# Patient Record
Sex: MALE | Race: BLACK OR AFRICAN AMERICAN | NOT HISPANIC OR LATINO | URBAN - METROPOLITAN AREA
[De-identification: names, ages, dates, MRNs, and addresses within clinical notes are randomized per-mention and may not be internally consistent; named-entity substitution may affect disease eponyms.]

---

## 2023-07-28 ENCOUNTER — HOSPITAL ENCOUNTER (OUTPATIENT)
Dept: DATA CONVERSION | Facility: HOSPITAL | Age: 28
Discharge: HOME | End: 2023-07-28

## 2023-07-28 DIAGNOSIS — M54.2 CERVICALGIA: ICD-10-CM

## 2023-07-28 DIAGNOSIS — S16.1XXA STRAIN OF MUSCLE, FASCIA AND TENDON AT NECK LEVEL, INITIAL ENCOUNTER: ICD-10-CM
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
## 2023-07-28 LAB
BACTERIA SPEC CULT: NORMAL
DEPRECATED S PYO AG THROAT QL EIA: NORMAL
REPORT STATUS -LH SQ DATA CONVERSION: NORMAL
SERVICE CMNT-IMP: NORMAL
SPECIMEN SOURCE: NORMAL

## 2025-06-10 ENCOUNTER — OFFICE VISIT (OUTPATIENT)
Dept: URGENT CARE | Age: 30
End: 2025-06-10
Payer: COMMERCIAL

## 2025-06-10 VITALS
OXYGEN SATURATION: 97 % | RESPIRATION RATE: 17 BRPM | SYSTOLIC BLOOD PRESSURE: 125 MMHG | WEIGHT: 217 LBS | TEMPERATURE: 98.1 F | BODY MASS INDEX: 29.39 KG/M2 | DIASTOLIC BLOOD PRESSURE: 77 MMHG | HEIGHT: 72 IN | HEART RATE: 70 BPM

## 2025-06-10 DIAGNOSIS — R10.9 ABDOMINAL PAIN, UNSPECIFIED ABDOMINAL LOCATION: Primary | ICD-10-CM

## 2025-06-10 ASSESSMENT — ENCOUNTER SYMPTOMS
LOSS OF SENSATION IN FEET: 0
ABDOMINAL PAIN: 1
DEPRESSION: 0
OCCASIONAL FEELINGS OF UNSTEADINESS: 0

## 2025-06-10 ASSESSMENT — PAIN SCALES - GENERAL: PAINLEVEL_OUTOF10: 4

## 2025-06-10 NOTE — PROGRESS NOTES
Subjective   Patient ID: Atif Regan is a 29 y.o. male. They present today with a chief complaint of Abdominal Pain (Stomach pain or food poison 1 day).    History of Present Illness  Pt presents with abd pain. Started around 6 pm last night after eating [employer]. Pain is located in the RLQ/periumbilical region. It is constant, non radiating. Described as aching. Rated 7-8/10. No hx of similar. Has not tried anything for sx at home. Denies aggravating and alleviating factors. Denies fever chills back pain urinary sx nvd constipation. Last BM this morning normal. No hematochezia, melena. Pt states he was at [facility] and left due to wait time.       History provided by:  Patient  Abdominal Pain        Past Medical History  Allergies as of 06/10/2025    (No Known Allergies)       Prescriptions Prior to Admission[1]     Medical History[2]    Surgical History[3]     reports that he has never smoked. He has never used smokeless tobacco.    Review of Systems  Review of Systems   Gastrointestinal:  Positive for abdominal pain.                                  Objective    Vitals:    06/10/25 1339   BP: 125/77   BP Location: Left arm   Patient Position: Sitting   BP Cuff Size: Adult   Pulse: 70   Resp: 17   Temp: 36.7 °C (98.1 °F)   TempSrc: Oral   SpO2: 97%   Weight: 98.4 kg (217 lb)   Height: 1.829 m (6')     No LMP for male patient.    Physical Exam  Vitals and nursing note reviewed.   Constitutional:       General: He is not in acute distress.     Appearance: Normal appearance. He is not ill-appearing, toxic-appearing or diaphoretic.   Cardiovascular:      Rate and Rhythm: Normal rate and regular rhythm.      Pulses: Normal pulses.   Pulmonary:      Effort: Pulmonary effort is normal. No respiratory distress.      Breath sounds: No wheezing, rhonchi or rales.   Abdominal:      General: Bowel sounds are normal. There is no distension.      Palpations: Abdomen is soft. There is no mass.      Tenderness: There  is abdominal tenderness (periumbilical, RLQ). There is no right CVA tenderness, left CVA tenderness, guarding or rebound. Negative signs include Rovsing's sign.      Hernia: No hernia is present.   Neurological:      Mental Status: He is alert.         Procedures    Point of Care Test & Imaging Results from this visit  No results found for this visit on 06/10/25.   Imaging  No results found.    Cardiology, Vascular, and Other Imaging  No other imaging results found for the past 2 days      Diagnostic study results (if any) were reviewed by Jeanna Olea PA-C.    Assessment/Plan   Allergies, medications, history, and pertinent labs/EKGs/Imaging reviewed by Jeanna Olea PA-C.     Medical Decision Making    Due to the medical service (s) limitations of the Urgent care, the Patient is being recommended for a higher level of medical evaluation in the emergency department. Provider has discussed the differential diagnoses and reasons for a higher level of evaluation due to the possibility of needing radiology testing, blood work, IV fluids/pain control/abx  as some examples. Patient Verbalizes Understanding and is agreeable to the plan. Patient stated will go to ER at [facility]. Take via PV by andres. Patient is alert and oriented X 3, steady gait, ABCs intact, no acute distress is noted. Advised of importance of evaluation and risks of not seeking further evaluation. Case D/w supervising Dr. Ortega.     Orders and Diagnoses  Diagnoses and all orders for this visit:  Abdominal pain, unspecified abdominal location      Medical Admin Record      Patient disposition: ED    Electronically signed by Jeanna Olea PA-C  1:59 PM           [1] (Not in a hospital admission)   [2] No past medical history on file.  [3] No past surgical history on file.

## 2025-07-13 ENCOUNTER — HOSPITAL ENCOUNTER (OUTPATIENT)
Facility: HOSPITAL | Age: 30
Setting detail: OBSERVATION
Discharge: HOME | End: 2025-07-14
Attending: EMERGENCY MEDICINE | Admitting: SURGERY
Payer: COMMERCIAL

## 2025-07-13 ENCOUNTER — APPOINTMENT (OUTPATIENT)
Dept: RADIOLOGY | Facility: HOSPITAL | Age: 30
End: 2025-07-13
Payer: COMMERCIAL

## 2025-07-13 DIAGNOSIS — K35.80 ACUTE APPENDICITIS: Primary | ICD-10-CM

## 2025-07-13 PROCEDURE — 2550000001 HC RX 255 CONTRASTS

## 2025-07-13 PROCEDURE — 99285 EMERGENCY DEPT VISIT HI MDM: CPT

## 2025-07-13 PROCEDURE — 80053 COMPREHEN METABOLIC PANEL: CPT

## 2025-07-13 PROCEDURE — 83690 ASSAY OF LIPASE: CPT

## 2025-07-13 PROCEDURE — 2500000001 HC RX 250 WO HCPCS SELF ADMINISTERED DRUGS (ALT 637 FOR MEDICARE OP)

## 2025-07-13 PROCEDURE — 36415 COLL VENOUS BLD VENIPUNCTURE: CPT

## 2025-07-13 PROCEDURE — 85025 COMPLETE CBC W/AUTO DIFF WBC: CPT

## 2025-07-13 PROCEDURE — 96375 TX/PRO/DX INJ NEW DRUG ADDON: CPT

## 2025-07-13 PROCEDURE — 2500000004 HC RX 250 GENERAL PHARMACY W/ HCPCS (ALT 636 FOR OP/ED)

## 2025-07-13 PROCEDURE — 74177 CT ABD & PELVIS W/CONTRAST: CPT

## 2025-07-13 PROCEDURE — 74177 CT ABD & PELVIS W/CONTRAST: CPT | Performed by: RADIOLOGY

## 2025-07-13 PROCEDURE — 81001 URINALYSIS AUTO W/SCOPE: CPT

## 2025-07-13 PROCEDURE — 99285 EMERGENCY DEPT VISIT HI MDM: CPT | Mod: 25 | Performed by: EMERGENCY MEDICINE

## 2025-07-13 RX ORDER — ONDANSETRON HYDROCHLORIDE 2 MG/ML
4 INJECTION, SOLUTION INTRAVENOUS ONCE
Status: COMPLETED | OUTPATIENT
Start: 2025-07-13 | End: 2025-07-13

## 2025-07-13 RX ORDER — DICYCLOMINE HYDROCHLORIDE 10 MG/1
10 CAPSULE ORAL ONCE
Status: COMPLETED | OUTPATIENT
Start: 2025-07-13 | End: 2025-07-13

## 2025-07-13 RX ADMIN — IOHEXOL 80 ML: 350 INJECTION, SOLUTION INTRAVENOUS at 23:04

## 2025-07-13 RX ADMIN — DICYCLOMINE HYDROCHLORIDE 10 MG: 10 CAPSULE ORAL at 22:56

## 2025-07-13 RX ADMIN — ONDANSETRON 4 MG: 2 INJECTION INTRAMUSCULAR; INTRAVENOUS at 22:56

## 2025-07-13 ASSESSMENT — LIFESTYLE VARIABLES
TOTAL SCORE: 0
HAVE PEOPLE ANNOYED YOU BY CRITICIZING YOUR DRINKING: NO
HAVE YOU EVER FELT YOU SHOULD CUT DOWN ON YOUR DRINKING: NO
EVER HAD A DRINK FIRST THING IN THE MORNING TO STEADY YOUR NERVES TO GET RID OF A HANGOVER: NO
EVER FELT BAD OR GUILTY ABOUT YOUR DRINKING: NO

## 2025-07-13 ASSESSMENT — PAIN DESCRIPTION - PAIN TYPE: TYPE: ACUTE PAIN

## 2025-07-13 ASSESSMENT — PAIN DESCRIPTION - LOCATION: LOCATION: ABDOMEN

## 2025-07-13 ASSESSMENT — PAIN SCALES - GENERAL: PAINLEVEL_OUTOF10: 8

## 2025-07-13 ASSESSMENT — PAIN - FUNCTIONAL ASSESSMENT: PAIN_FUNCTIONAL_ASSESSMENT: 0-10

## 2025-07-13 NOTE — LETTER
July 14, 2025     Patient: Atif Regan   YOB: 1995   Date of Visit: 7/13/2025       To Whom It May Concern:    It is my medical opinion that Atif Regan may return to work on 7/15/2025.    If you have any questions or concerns, please don't hesitate to call.         Sincerely,        Helder Mcneil MD

## 2025-07-13 NOTE — LETTER
July 14, 2025     Patient: Atif Regan       Date of Visit: 7/13/2025       To Whom It May Concern:    It is my medical opinion that Atif Regan may return to work on 7/15/2025.    If you have any questions or concerns, please don't hesitate to call.         Sincerely,      Helder Mcneil MD

## 2025-07-14 VITALS
WEIGHT: 217 LBS | BODY MASS INDEX: 32.89 KG/M2 | HEIGHT: 68 IN | OXYGEN SATURATION: 97 % | DIASTOLIC BLOOD PRESSURE: 84 MMHG | HEART RATE: 75 BPM | SYSTOLIC BLOOD PRESSURE: 144 MMHG | TEMPERATURE: 97.8 F | RESPIRATION RATE: 16 BRPM

## 2025-07-14 PROBLEM — K35.80 ACUTE APPENDICITIS: Status: ACTIVE | Noted: 2025-07-14

## 2025-07-14 LAB
ABO GROUP (TYPE) IN BLOOD: NORMAL
ALBUMIN SERPL BCP-MCNC: 4.6 G/DL (ref 3.4–5)
ALP SERPL-CCNC: 73 U/L (ref 33–120)
ALT SERPL W P-5'-P-CCNC: 32 U/L (ref 10–52)
ANION GAP SERPL CALC-SCNC: 13 MMOL/L (ref 10–20)
ANTIBODY SCREEN: NORMAL
APPEARANCE UR: CLEAR
AST SERPL W P-5'-P-CCNC: 31 U/L (ref 9–39)
BASOPHILS # BLD AUTO: 0.07 X10*3/UL (ref 0–0.1)
BASOPHILS NFR BLD AUTO: 0.4 %
BILIRUB SERPL-MCNC: 0.4 MG/DL (ref 0–1.2)
BILIRUB UR STRIP.AUTO-MCNC: NEGATIVE MG/DL
BUN SERPL-MCNC: 11 MG/DL (ref 6–23)
CALCIUM SERPL-MCNC: 9.9 MG/DL (ref 8.6–10.6)
CHLORIDE SERPL-SCNC: 101 MMOL/L (ref 98–107)
CO2 SERPL-SCNC: 27 MMOL/L (ref 21–32)
COLOR UR: NORMAL
CREAT SERPL-MCNC: 1 MG/DL (ref 0.5–1.3)
EGFRCR SERPLBLD CKD-EPI 2021: >90 ML/MIN/1.73M*2
EOSINOPHIL # BLD AUTO: 0.26 X10*3/UL (ref 0–0.7)
EOSINOPHIL NFR BLD AUTO: 1.5 %
ERYTHROCYTE [DISTWIDTH] IN BLOOD BY AUTOMATED COUNT: 12.5 % (ref 11.5–14.5)
GLUCOSE SERPL-MCNC: 87 MG/DL (ref 74–99)
GLUCOSE UR STRIP.AUTO-MCNC: NORMAL MG/DL
HCT VFR BLD AUTO: 46.4 % (ref 41–52)
HGB BLD-MCNC: 15.4 G/DL (ref 13.5–17.5)
HOLD SPECIMEN: NORMAL
IMM GRANULOCYTES # BLD AUTO: 0.03 X10*3/UL (ref 0–0.7)
IMM GRANULOCYTES NFR BLD AUTO: 0.2 % (ref 0–0.9)
KETONES UR STRIP.AUTO-MCNC: NEGATIVE MG/DL
LEUKOCYTE ESTERASE UR QL STRIP.AUTO: NEGATIVE
LIPASE SERPL-CCNC: 25 U/L (ref 9–82)
LYMPHOCYTES # BLD AUTO: 2.45 X10*3/UL (ref 1.2–4.8)
LYMPHOCYTES NFR BLD AUTO: 14.4 %
MCH RBC QN AUTO: 26.6 PG (ref 26–34)
MCHC RBC AUTO-ENTMCNC: 33.2 G/DL (ref 32–36)
MCV RBC AUTO: 80 FL (ref 80–100)
MONOCYTES # BLD AUTO: 1.31 X10*3/UL (ref 0.1–1)
MONOCYTES NFR BLD AUTO: 7.7 %
NEUTROPHILS # BLD AUTO: 12.87 X10*3/UL (ref 1.2–7.7)
NEUTROPHILS NFR BLD AUTO: 75.8 %
NITRITE UR QL STRIP.AUTO: NEGATIVE
NRBC BLD-RTO: 0 /100 WBCS (ref 0–0)
PH UR STRIP.AUTO: 6.5 [PH]
PLATELET # BLD AUTO: 229 X10*3/UL (ref 150–450)
POTASSIUM SERPL-SCNC: 3.8 MMOL/L (ref 3.5–5.3)
PROT SERPL-MCNC: 7.9 G/DL (ref 6.4–8.2)
PROT UR STRIP.AUTO-MCNC: NORMAL MG/DL
RBC # BLD AUTO: 5.79 X10*6/UL (ref 4.5–5.9)
RBC # UR STRIP.AUTO: NEGATIVE MG/DL
RBC #/AREA URNS AUTO: NORMAL /HPF
RH FACTOR (ANTIGEN D): NORMAL
SODIUM SERPL-SCNC: 137 MMOL/L (ref 136–145)
SP GR UR STRIP.AUTO: 1.03
UROBILINOGEN UR STRIP.AUTO-MCNC: NORMAL MG/DL
WBC # BLD AUTO: 17 X10*3/UL (ref 4.4–11.3)
WBC #/AREA URNS AUTO: NORMAL /HPF

## 2025-07-14 PROCEDURE — 96361 HYDRATE IV INFUSION ADD-ON: CPT

## 2025-07-14 PROCEDURE — 96372 THER/PROPH/DIAG INJ SC/IM: CPT

## 2025-07-14 PROCEDURE — 36415 COLL VENOUS BLD VENIPUNCTURE: CPT

## 2025-07-14 PROCEDURE — 96365 THER/PROPH/DIAG IV INF INIT: CPT | Mod: 59

## 2025-07-14 PROCEDURE — 96366 THER/PROPH/DIAG IV INF ADDON: CPT

## 2025-07-14 PROCEDURE — 86901 BLOOD TYPING SEROLOGIC RH(D): CPT

## 2025-07-14 PROCEDURE — 2500000001 HC RX 250 WO HCPCS SELF ADMINISTERED DRUGS (ALT 637 FOR MEDICARE OP): Performed by: STUDENT IN AN ORGANIZED HEALTH CARE EDUCATION/TRAINING PROGRAM

## 2025-07-14 PROCEDURE — 2500000004 HC RX 250 GENERAL PHARMACY W/ HCPCS (ALT 636 FOR OP/ED): Performed by: EMERGENCY MEDICINE

## 2025-07-14 PROCEDURE — 2500000004 HC RX 250 GENERAL PHARMACY W/ HCPCS (ALT 636 FOR OP/ED)

## 2025-07-14 PROCEDURE — 96375 TX/PRO/DX INJ NEW DRUG ADDON: CPT | Mod: 59

## 2025-07-14 PROCEDURE — 99221 1ST HOSP IP/OBS SF/LOW 40: CPT | Performed by: SURGERY

## 2025-07-14 PROCEDURE — 1210000001 HC SEMI-PRIVATE ROOM DAILY

## 2025-07-14 PROCEDURE — G0378 HOSPITAL OBSERVATION PER HR: HCPCS

## 2025-07-14 PROCEDURE — 96376 TX/PRO/DX INJ SAME DRUG ADON: CPT

## 2025-07-14 PROCEDURE — 2500000004 HC RX 250 GENERAL PHARMACY W/ HCPCS (ALT 636 FOR OP/ED): Mod: JZ | Performed by: STUDENT IN AN ORGANIZED HEALTH CARE EDUCATION/TRAINING PROGRAM

## 2025-07-14 RX ORDER — ACETAMINOPHEN 650 MG/1
650 SUPPOSITORY RECTAL EVERY 6 HOURS
Status: DISCONTINUED | OUTPATIENT
Start: 2025-07-14 | End: 2025-07-14 | Stop reason: HOSPADM

## 2025-07-14 RX ORDER — ACETAMINOPHEN 325 MG/1
650 TABLET ORAL EVERY 6 HOURS
Status: DISCONTINUED | OUTPATIENT
Start: 2025-07-14 | End: 2025-07-14 | Stop reason: HOSPADM

## 2025-07-14 RX ORDER — CEFTRIAXONE 1 G/1
1 INJECTION, POWDER, FOR SOLUTION INTRAMUSCULAR; INTRAVENOUS ONCE
Status: COMPLETED | OUTPATIENT
Start: 2025-07-14 | End: 2025-07-14

## 2025-07-14 RX ORDER — ENOXAPARIN SODIUM 100 MG/ML
30 INJECTION SUBCUTANEOUS 2 TIMES DAILY
Status: DISCONTINUED | OUTPATIENT
Start: 2025-07-14 | End: 2025-07-14 | Stop reason: HOSPADM

## 2025-07-14 RX ORDER — ONDANSETRON HYDROCHLORIDE 2 MG/ML
4 INJECTION, SOLUTION INTRAVENOUS ONCE
Status: COMPLETED | OUTPATIENT
Start: 2025-07-14 | End: 2025-07-14

## 2025-07-14 RX ORDER — SODIUM CHLORIDE, SODIUM LACTATE, POTASSIUM CHLORIDE, CALCIUM CHLORIDE 600; 310; 30; 20 MG/100ML; MG/100ML; MG/100ML; MG/100ML
75 INJECTION, SOLUTION INTRAVENOUS CONTINUOUS
Status: DISCONTINUED | OUTPATIENT
Start: 2025-07-14 | End: 2025-07-14 | Stop reason: HOSPADM

## 2025-07-14 RX ORDER — KETOROLAC TROMETHAMINE 15 MG/ML
15 INJECTION, SOLUTION INTRAMUSCULAR; INTRAVENOUS EVERY 6 HOURS
Status: DISCONTINUED | OUTPATIENT
Start: 2025-07-14 | End: 2025-07-14 | Stop reason: HOSPADM

## 2025-07-14 RX ORDER — METRONIDAZOLE 500 MG/100ML
500 INJECTION, SOLUTION INTRAVENOUS ONCE
Status: COMPLETED | OUTPATIENT
Start: 2025-07-14 | End: 2025-07-14

## 2025-07-14 RX ORDER — CEFTRIAXONE 2 G/50ML
2 INJECTION, SOLUTION INTRAVENOUS EVERY 24 HOURS
Status: DISCONTINUED | OUTPATIENT
Start: 2025-07-15 | End: 2025-07-14 | Stop reason: HOSPADM

## 2025-07-14 RX ORDER — ACETAMINOPHEN 160 MG/5ML
650 SOLUTION ORAL EVERY 6 HOURS
Status: DISCONTINUED | OUTPATIENT
Start: 2025-07-14 | End: 2025-07-14 | Stop reason: HOSPADM

## 2025-07-14 RX ORDER — METRONIDAZOLE 500 MG/100ML
500 INJECTION, SOLUTION INTRAVENOUS EVERY 8 HOURS
Status: DISCONTINUED | OUTPATIENT
Start: 2025-07-14 | End: 2025-07-14 | Stop reason: HOSPADM

## 2025-07-14 RX ORDER — AMOXICILLIN AND CLAVULANATE POTASSIUM 875; 125 MG/1; MG/1
1 TABLET, FILM COATED ORAL 2 TIMES DAILY
Qty: 14 TABLET | Refills: 0 | Status: SHIPPED | OUTPATIENT
Start: 2025-07-14 | End: 2025-07-21

## 2025-07-14 RX ADMIN — KETOROLAC TROMETHAMINE 15 MG: 15 INJECTION, SOLUTION INTRAMUSCULAR; INTRAVENOUS at 02:35

## 2025-07-14 RX ADMIN — CEFTRIAXONE SODIUM 1 G: 1 INJECTION, POWDER, FOR SOLUTION INTRAMUSCULAR; INTRAVENOUS at 01:07

## 2025-07-14 RX ADMIN — METRONIDAZOLE 500 MG: 500 INJECTION, SOLUTION INTRAVENOUS at 08:03

## 2025-07-14 RX ADMIN — SODIUM CHLORIDE, POTASSIUM CHLORIDE, SODIUM LACTATE AND CALCIUM CHLORIDE 75 ML/HR: 600; 310; 30; 20 INJECTION, SOLUTION INTRAVENOUS at 02:40

## 2025-07-14 RX ADMIN — ACETAMINOPHEN 650 MG: 325 TABLET ORAL at 02:35

## 2025-07-14 RX ADMIN — KETOROLAC TROMETHAMINE 15 MG: 15 INJECTION, SOLUTION INTRAMUSCULAR; INTRAVENOUS at 08:03

## 2025-07-14 RX ADMIN — ONDANSETRON 4 MG: 2 INJECTION INTRAMUSCULAR; INTRAVENOUS at 01:07

## 2025-07-14 RX ADMIN — ACETAMINOPHEN 650 MG: 325 TABLET ORAL at 08:03

## 2025-07-14 RX ADMIN — METRONIDAZOLE 500 MG: 500 INJECTION, SOLUTION INTRAVENOUS at 01:07

## 2025-07-14 ASSESSMENT — PAIN SCALES - GENERAL
PAINLEVEL_OUTOF10: 0 - NO PAIN
PAINLEVEL_OUTOF10: 8
PAINLEVEL_OUTOF10: 0 - NO PAIN
PAINLEVEL_OUTOF10: 3

## 2025-07-14 ASSESSMENT — PAIN - FUNCTIONAL ASSESSMENT
PAIN_FUNCTIONAL_ASSESSMENT: 0-10

## 2025-07-14 ASSESSMENT — PAIN DESCRIPTION - LOCATION
LOCATION: ABDOMEN
LOCATION: ABDOMEN

## 2025-07-14 NOTE — PROGRESS NOTES
Pharmacy Medication History Review    Atif Regan is a 29 y.o. male admitted for Acute appendicitis. Pharmacy reviewed the patient's [id number] medications and allergies for accuracy.    The list below reflects the updated PTA list.   None        The list below reflects the updated allergy list. Please review each documented allergy for additional clarification and justification.  Allergies  Reviewed by Slime Mena RN on 7/13/2025   No Known Allergies         Patient accepts M2B at discharge. Pharmacy has been updated to [facility].    Sources used to complete the med history include:    [facility]  Pharmacy dispense history  Patient Interview Good historian  Chart Review  Care Everywhere   Office visit 6/30/25 MISAEL CHACON PA-C     Below are additional concerns with the patient's PTA list.  Patient is currently taking no prescription or over the counter medications at home.      SEE OAS BELOW      Medications ADDED:  None   Medications CHANGED:  None   Medications REMOVED:   None     Fernando Montanez [facility].   Transitions of Care Pharmacist    Please reach out via Secure Chat for questions, or if no response call [employer] or [facility]

## 2025-07-14 NOTE — DISCHARGE INSTRUCTIONS
Please complete 7 day course of antibiotics as prescribed.    Return to ED if abdominal pain returns or does not improve, you are unable to tolerate food or drink, or if you have a fever above 100.4F.

## 2025-07-14 NOTE — ED PROVIDER NOTES
HPI   Chief Complaint   Patient presents with    Abdominal Pain       This patient is a 29-year-old male presenting today for abdominal pain.  Started approximately 5 to 6 hours ago.  Described as periumbilical abdominal pain radiating to his right lower quadrant abdomen and left lower quadrant abdomen.  States that it was mildly uncomfortable this afternoon though notes that for dinner around 8 PM, he ate a fish sandwich and it made it progressively worse.  Pain is described as a cramping sensation and sharp.  He is endorsing nausea without emesis.  He has had 2 bouts of nonbloody diarrhea.  No fever or chills.  He states that he has had abdominal pain 1 time like this in the past.  He tried to be seen but the wait was too long so he left.  It eventually resolved on its own.  Denies any prior surgeries to his abdomen.              Patient History   Medical History[1]  Surgical History[2]  Family History[3]  Social History[4]    Physical Exam   ED Triage Vitals [07/13/25 2207]   Temperature Heart Rate Respirations BP   36.6 °C (97.8 °F) 69 18 (!) 148/91      Pulse Ox Temp Source Heart Rate Source Patient Position   98 % Temporal Monitor Sitting      BP Location FiO2 (%)     Left arm --       Physical Exam  Vitals and nursing note reviewed.   Constitutional:       General: He is not in acute distress.     Appearance: Normal appearance. He is not ill-appearing.   HENT:      Head: Normocephalic and atraumatic.      Right Ear: External ear normal.      Left Ear: External ear normal.      Nose: Nose normal.      Mouth/Throat:      Mouth: Mucous membranes are moist.   Eyes:      Extraocular Movements: Extraocular movements intact.      Conjunctiva/sclera: Conjunctivae normal.      Pupils: Pupils are equal, round, and reactive to light.   Cardiovascular:      Rate and Rhythm: Normal rate and regular rhythm.      Heart sounds: Normal heart sounds.   Pulmonary:      Effort: No accessory muscle usage or respiratory distress.       Breath sounds: Normal breath sounds. No wheezing, rhonchi or rales.   Abdominal:      General: Abdomen is flat. Bowel sounds are normal. There is no distension.      Palpations: Abdomen is soft.      Tenderness: There is abdominal tenderness in the right lower quadrant and periumbilical area. There is guarding. There is no right CVA tenderness or left CVA tenderness.   Musculoskeletal:         General: No swelling or deformity. Normal range of motion.      Cervical back: Normal range of motion and neck supple.      Right lower leg: No edema.      Left lower leg: No edema.   Skin:     General: Skin is warm and dry.      Capillary Refill: Capillary refill takes less than 2 seconds.   Neurological:      General: No focal deficit present.      Mental Status: He is alert and oriented to person, place, and time.      GCS: GCS eye subscore is 4. GCS verbal subscore is 5. GCS motor subscore is 6.      Cranial Nerves: Cranial nerves 2-12 are intact.      Sensory: No sensory deficit.      Motor: Motor function is intact. No weakness.   Psychiatric:         Mood and Affect: Mood and affect normal.         Speech: Speech normal.         Behavior: Behavior normal. Behavior is cooperative.           ED Course & MDM   ED Course as of 07/15/25 1241   Mon Jul 14, 2025   0023 CT abdomen pelvis w IV contrast   Thickened and fluid-filled appearance of the distal appendix with  mild periappendiceal fat stranding, compatible with early acute  appendicitis. No associated periappendiceal fluid collections or  appendiceal wall hypoenhancement/discontinuity. No visualized  radiopaque fecaliths.   [ML]   8966 I was informed by the ACS attending to advance this patient's diet to regular diet.  That order was placed.  Was also informed that if the patient experience nausea and vomiting with p.o. that ACS needs to be contacted.  This information was placed in the ED, track board. [GA]      ED Course User Index  [GA] Manuel Tran MD  [ML]  Vandana Stewart PA-C         Diagnoses as of 07/15/25 1241   Acute appendicitis                 No data recorded     Carly Coma Scale Score: 15 (07/13/25 2206 : Slime Mena RN)                           Medical Decision Making  Patient is a 29-year-old male presenting today for abdominal pain.  Ongoing since 5 PM today.  He notes that he ate fish sandwich and it started happening.  Describes periumbilical pain with radiation to his right lower quadrant.  On exam, he does have right lower quadrant abdominal pain to palpation.  He has mild guarding without rigidity.  He is afebrile, no vitals meeting SIRS criteria.  Endorsing some mild diarrhea.  I gave the patient Bentyl and Zofran for symptomatic control.  CT scan ordered to rule out appendicitis.  Basic labs ordered.    CBC does have a white count at 17.0.  CMP, lipase, UA normal.  CT abdomen pelvis does show thickened and fluid-filled distal appendix with mild periappendiceal fat stranding.  Findings consistent with early acute appendicitis.  General surgery was called and evaluated the patient.  They recommended admission under their team for further management of appendicitis.    It is noted that the patient's insurance is out of network.  Patient does have an emergent diagnosis which necessitates admission to this hospital for further management in order to prevent any delayed care for this patient's emergent diagnoses.        Procedure  Procedures    Vandana Stewart PA-C  07/14/25 0212    -------------------------------------------    This patient was seen by the advanced practice provider. I have seen and examined the patient, agree with the workup, evaluation, management and diagnosis. The care plan has been discussed and I concur.    My assessment reveals the following:    HPI: Patient is a 30 y/o male presenting with periumbilical pain radiating to RLQ since 5pm tonight. Reports cramping with diarrhea. No N/V initially, but had some right before my  exam. No urinary complaints. No F/C. No CP/SOB. H/o similar symptoms, but did not stay for evaluation at [facility] at that time due to wait.     PE:  Vital signs reviewed in nursing triage note, EMR flowsheets, and at patient's bedside  GEN: Patient is awake, alert, calm, cooperative, and in mild painful and GI distress.  HEAD: Normocephalic and atraumatic.  EYES: Anicteric sclera.  MOUTH: Mucous membranes moist.  CV: Regular rate and rhythm. (+) s1/s2. No murmurs/rubs/gallops.  PULM: CTAB. No wheezes, rales, or crackles.  GI: Soft, mild RLQ TTP, non-distended without rebound or guarding.  EXT: No deformities noted.  NEURO: Moves all extremities.   SKIN: Warm, dry. No erythema or ecchymosis.    Labs Reviewed   CBC WITH AUTO DIFFERENTIAL - Abnormal       Result Value    WBC 17.0 (*)     nRBC 0.0      RBC 5.79      Hemoglobin 15.4      Hematocrit 46.4      MCV 80      MCH 26.6      MCHC 33.2      RDW 12.5      Platelets 229      Neutrophils % 75.8      Immature Granulocytes %, Automated 0.2      Lymphocytes % 14.4      Monocytes % 7.7      Eosinophils % 1.5      Basophils % 0.4      Neutrophils Absolute 12.87 (*)     Immature Granulocytes Absolute, Automated 0.03      Lymphocytes Absolute 2.45      Monocytes Absolute 1.31 (*)     Eosinophils Absolute 0.26      Basophils Absolute 0.07     LIPASE - Normal    Lipase 25      Narrative:     Venipuncture immediately after or during the administration of Metamizole may lead to falsely low results. Testing should be performed immediately prior to Metamizole dosing.   COMPREHENSIVE METABOLIC PANEL - Normal    Glucose 87      Sodium 137      Potassium 3.8      Chloride 101      Bicarbonate 27      Anion Gap 13      Urea Nitrogen 11      Creatinine 1.00      eGFR >90      Calcium 9.9      Albumin 4.6      Alkaline Phosphatase 73      Total Protein 7.9      AST 31      Bilirubin, Total 0.4      ALT 32     URINALYSIS WITH REFLEX CULTURE AND MICROSCOPIC - Normal    Color, Urine  Light-Yellow      Appearance, Urine Clear      Specific Gravity, Urine 1.027      pH, Urine 6.5      Protein, Urine 20 (TRACE)      Glucose, Urine Normal      Blood, Urine NEGATIVE      Ketones, Urine NEGATIVE      Bilirubin, Urine NEGATIVE      Urobilinogen, Urine Normal      Nitrite, Urine NEGATIVE      Leukocyte Esterase, Urine NEGATIVE     URINALYSIS MICROSCOPIC WITH REFLEX CULTURE - Normal    WBC, Urine NONE      RBC, Urine 1-2     URINALYSIS WITH REFLEX CULTURE AND MICROSCOPIC    Narrative:     The following orders were created for panel order Urinalysis with Reflex Culture and Microscopic.  Procedure                               Abnormality         Status                     ---------                               -----------         ------                     Urinalysis with Reflex C...[[id number]]  Normal              Final result               Extra Urine Gray Tube[[id number]]                            Final result                 Please view results for these tests on the individual orders.   EXTRA URINE GRAY TUBE    Extra Tube       TYPE AND SCREEN    ABO TYPE O      Rh TYPE POS      ANTIBODY SCREEN NEG       CT abdomen pelvis w IV contrast   Final Result   1. Thickened and fluid-filled appearance of the distal appendix with   mild periappendiceal fat stranding, compatible with early acute   appendicitis. No associated periappendiceal fluid collections or   appendiceal wall hypoenhancement/discontinuity. No visualized   radiopaque fecaliths.   2. No other acute abnormality within the abdomen or pelvis.        I personally reviewed the image(s)/study and interpretation by   César Daniel MD (resident  PGY-3).        MACRO:   None.        Signed by: Duane Serra 7/14/2025 12:26 AM   Dictation workstation:   [id number]            Medical Decision Making:  - IV  - Labs  - Pain meds  - Nausea meds  - CT abd/pelvis with IV contrast  - ACS consult  - Re-evaluation    Differential Diagnoses Considered:  Appendicitis, Colitis, Gastroenteritis.     Chronic Medical Conditions Significantly Affecting Care: Cesar Novoa MD            [1] History reviewed. No pertinent past medical history.  [2] History reviewed. No pertinent surgical history.  [3] No family history on file.  [4]   Social History  Tobacco Use    Smoking status: Never    Smokeless tobacco: Never   Substance Use Topics    Alcohol use: Not on file    Drug use: Not on file        Tima Novoa MD  07/15/25 3974

## 2025-07-14 NOTE — DISCHARGE SUMMARY
Discharge Diagnosis  Acute appendicitis    Issues Requiring Follow-Up  Follow up in clinic if needed. Return to ED precautions discussed with patient.     Test Results Pending At Discharge  Pending Labs       No current pending labs.            Hospital Course  29yoM with no significant PMH presenting with early acute appendicitis. Discussed with patient operative vs nonoperative management and he elected to pursue nonoperative management. Risks were discussed as well as ED return precautions. He acknowledged understanding. He was discharged home on PO Augmentin BID x7 days.       Visit Vitals  /84 (BP Location: Right arm, Patient Position: Sitting)   Pulse 75   Temp 36.6 °C (97.8 °F) (Temporal)   Resp 16     Vitals:    07/13/25 2207   Weight: 98.4 kg (217 lb)         There is no immunization history on file for this patient.    Results        Pertinent Physical Exam At Time of Discharge  Physical Exam  Constitutional:       General: He is not in acute distress.     Appearance: He is not ill-appearing.   Cardiovascular:      Rate and Rhythm: Normal rate.   Pulmonary:      Effort: Pulmonary effort is normal. No respiratory distress.   Abdominal:      General: Abdomen is flat. There is no distension.      Palpations: Abdomen is soft.      Tenderness: There is abdominal tenderness (mild RLQ). There is no guarding or rebound.   Skin:     General: Skin is warm and dry.   Neurological:      Mental Status: He is alert.         Home Medications     Medication List      START taking these medications     amoxicillin-clavulanate 875-125 mg tablet; Commonly known as: Augmentin;   Take 1 tablet by mouth 2 times a day for 7 days.       Outpatient Follow-Up  No future appointments.    Helder Mcneil MD   PGY-1 General Surgery

## 2025-07-14 NOTE — H&P
[facility]  ACUTE CARE SURGERY - HISTORY AND PHYSICAL / CONSULT    Patient Name: Atif Regan  MRN: [id number]  Admit Date: 713  : 1995  AGE: 29 y.o.   GENDER: male  ==============================================================================  TODAY'S ASSESSMENT AND PLAN OF CARE:  29yoM with no significant PMH presenting with early acute appendicitis    Discussed with patient operative vs nonoperative management. He is worried about the amount of time he would have to tke off of work and would prefer nonoperative management. Since he does not have an appendicolith and he is otherwise benign we decided that would be reasonable. We discussed that he has a 33% chance of recurrent appendicitis in 1 year and 40% in 5 years and that if it were to happen again we would recommend surgery. He voiced his understanding.    Plan:  - admission for observation  - continue ceftriaxone, flagyl  - IVF  - CLD  - pain management  - serial abdominal exams    Patient discussed with Dr. Geremias Ruiz MD  General Surgery PGY-4      ==============================================================================  CHIEF COMPLAINT/REASON FOR CONSULT:  28yo healthy male presenting with less than 24 hours of progressive abdominal pain. Pain started in epigastric area and migrated to his RLQ. He ate a fish sandwich around 5pm and felt  his pain worsen. He has developed worsening nausea and had one episode of emesis prior to his ED arrival. He has had 2 episodes of diarrhea. No fevers here.    PAST MEDICAL HISTORY:   PMH: none  PSH: none  FH: no hx bleeding or clotting disorders, colonic disorders. +DM  SOCIAL HISTORY: no smoking, no illicit drug use. Works as  at [employer]  MEDICATIONS:   Prior to Admission medications    Not on File   ALLERGIES:   RX Allergies[1]    REVIEW OF SYSTEMS: negative other than noted in HPI    PHYSICAL EXAM:  General: Awake,  alert, conversive, nontoxic appearing  HENT: NCAT  Eyes: EOMI, no icterus  Cardio: RR  Respiratory/Thorax: even, unlabored on RA  Gastrointestinal: soft, ND, focally tender in RLQ without rebound or guarding  Genitourinary: voiding  Skin: warm, dry  Musculoskeletal: RAMIREZ  Extremities: no edema   Psychological: appropriate mood/affect    IMAGING SUMMARY:  (summary of findings, not a copy of dictation)  CT A/P with dilated appendix to 11mm, no appendicolith, no abscess, +mild fat stranding    LABS:  Results from last 7 days   Lab Units 07/13/25  2255   WBC AUTO x10*3/uL 17.0*   HEMOGLOBIN g/dL 15.4   HEMATOCRIT % 46.4   PLATELETS AUTO x10*3/uL 229   NEUTROS PCT AUTO % 75.8   LYMPHS PCT AUTO % 14.4   MONOS PCT AUTO % 7.7   EOS PCT AUTO % 1.5         Results from last 7 days   Lab Units 07/13/25  2255   SODIUM mmol/L 137   POTASSIUM mmol/L 3.8   CHLORIDE mmol/L 101   CO2 mmol/L 27   BUN mg/dL 11   CREATININE mg/dL 1.00   CALCIUM mg/dL 9.9   PROTEIN TOTAL g/dL 7.9   BILIRUBIN TOTAL mg/dL 0.4   ALK PHOS U/L 73   ALT U/L 32   AST U/L 31   GLUCOSE mg/dL 87     Results from last 7 days   Lab Units 07/13/25  2255   BILIRUBIN TOTAL mg/dL 0.4             I have reviewed all laboratory and imaging results ordered/pertinent for this encounter.         [1] No Known Allergies

## 2025-07-14 NOTE — HOSPITAL COURSE
29yoM with no significant PMH presenting with early acute appendicitis. Discussed with patient operative vs nonoperative management and he elected to pursue nonoperative management. Risks were discussed as well as ED return precautions. He acknowledged understanding. He was discharged home on PO Augmentin BID x7 days.